# Patient Record
Sex: FEMALE | Race: WHITE | NOT HISPANIC OR LATINO | Employment: OTHER | URBAN - METROPOLITAN AREA
[De-identification: names, ages, dates, MRNs, and addresses within clinical notes are randomized per-mention and may not be internally consistent; named-entity substitution may affect disease eponyms.]

---

## 2020-06-23 ENCOUNTER — TELEPHONE (OUTPATIENT)
Dept: PAIN MEDICINE | Facility: CLINIC | Age: 60
End: 2020-06-23

## 2020-06-23 NOTE — TELEPHONE ENCOUNTER
Called patient at all available numbers in reference to a referral to  Ambulatory Colorectal Surgery for colon cancer screening, patient requested to be called back next week.

## 2020-06-23 NOTE — TELEPHONE ENCOUNTER
----- Message from Kaylah Tobias sent at 6/23/2020  4:22 PM CDT -----  Good afternoon,       NP Tiffany Figueroa would like to refer the following patient to [facility] for a colonoscopy. I have a scanned the following patient's referral and records into .     If there are any further questions in regards to the patient, please contact referring office at [phone].       Thank you,   Kaylah   Paris Small

## 2021-01-22 PROBLEM — K52.9 ACUTE GASTROENTERITIS: Status: ACTIVE | Noted: 2021-01-22

## 2021-01-22 PROBLEM — E87.1 HYPONATREMIA: Status: ACTIVE | Noted: 2021-01-22

## 2021-01-22 PROBLEM — E87.6 HYPOKALEMIA: Status: ACTIVE | Noted: 2021-01-22

## 2021-01-22 PROBLEM — Z72.0 TOBACCO ABUSE: Status: ACTIVE | Noted: 2021-01-22

## 2021-01-22 PROBLEM — N17.9 AKI (ACUTE KIDNEY INJURY): Status: ACTIVE | Noted: 2021-01-22

## 2021-01-22 PROBLEM — M25.559 HIP PAIN: Status: ACTIVE | Noted: 2021-01-22

## 2021-01-22 PROBLEM — G62.9 NEUROPATHY: Status: ACTIVE | Noted: 2021-01-22

## 2021-01-22 PROBLEM — J44.1 COPD EXACERBATION: Status: ACTIVE | Noted: 2021-01-22

## 2021-01-24 PROBLEM — K52.9 ACUTE GASTROENTERITIS: Status: RESOLVED | Noted: 2021-01-22 | Resolved: 2021-01-24

## 2021-01-24 PROBLEM — J44.1 COPD EXACERBATION: Status: RESOLVED | Noted: 2021-01-22 | Resolved: 2021-01-24

## 2021-01-24 PROBLEM — E87.1 HYPONATREMIA: Status: RESOLVED | Noted: 2021-01-22 | Resolved: 2021-01-24

## 2021-01-24 PROBLEM — E87.6 HYPOKALEMIA: Status: RESOLVED | Noted: 2021-01-22 | Resolved: 2021-01-24

## 2021-01-24 PROBLEM — F10.10 ALCOHOL ABUSE: Status: ACTIVE | Noted: 2021-01-24

## 2021-01-24 PROBLEM — N17.9 AKI (ACUTE KIDNEY INJURY): Status: RESOLVED | Noted: 2021-01-22 | Resolved: 2021-01-24

## 2021-01-26 ENCOUNTER — PATIENT OUTREACH (OUTPATIENT)
Dept: ADMINISTRATIVE | Facility: CLINIC | Age: 61
End: 2021-01-26

## 2023-04-20 ENCOUNTER — TELEPHONE (OUTPATIENT)
Dept: DERMATOLOGY | Facility: CLINIC | Age: 63
End: 2023-04-20
Payer: MEDICAID

## 2023-04-20 NOTE — TELEPHONE ENCOUNTER
NP scheduled for same-day Mohs 6/13 7:50am  BCC nasal dorsum, bx by Dr. Reich. Pt cnfirmed date, time, and location. Over-the-phone consult completed. Appt information sent in the mail.

## 2023-06-12 ENCOUNTER — TELEPHONE (OUTPATIENT)
Dept: DERMATOLOGY | Facility: CLINIC | Age: 63
End: 2023-06-12
Payer: MEDICAID

## 2023-06-12 DIAGNOSIS — C44.311 BASAL CELL CARCINOMA (BCC) OF DORSUM OF NOSE: Primary | ICD-10-CM

## 2023-06-12 RX ORDER — DIAZEPAM 5 MG/1
5 TABLET ORAL EVERY 6 HOURS PRN
Qty: 2 TABLET | Refills: 0 | Status: SHIPPED | OUTPATIENT
Start: 2023-06-12 | End: 2023-06-13

## 2023-06-12 NOTE — TELEPHONE ENCOUNTER
----- Message from Penny Goodwin PA-C sent at 6/12/2023  4:06 PM CDT -----  Regarding: RE: Valium prescription  Please call patient and inform her I sent the prescription to her [facility] pharmacy.    Thanks,  An  ----- Message -----  From: Annika Olea RN  Sent: 6/12/2023   1:41 PM CDT  To: Penny Goodwin PA-C  Subject: Valium prescription                              This is the patient who is requesting a valium prescription. Thanks!

## 2023-06-12 NOTE — TELEPHONE ENCOUNTER
Called patient to confirm appointment for BCC nasal dorsum on 6/13 at 7:50am. Patient requested a valium prescription. Sent patient's chart to An EVERETT Goodwin

## 2023-06-13 ENCOUNTER — PROCEDURE VISIT (OUTPATIENT)
Dept: DERMATOLOGY | Facility: CLINIC | Age: 63
End: 2023-06-13
Payer: MEDICAID

## 2023-06-13 ENCOUNTER — ANESTHESIA EVENT (OUTPATIENT)
Dept: SURGERY | Facility: HOSPITAL | Age: 63
End: 2023-06-13

## 2023-06-13 VITALS
BODY MASS INDEX: 23.04 KG/M2 | HEIGHT: 63 IN | DIASTOLIC BLOOD PRESSURE: 102 MMHG | WEIGHT: 130.06 LBS | HEART RATE: 91 BPM | SYSTOLIC BLOOD PRESSURE: 180 MMHG

## 2023-06-13 DIAGNOSIS — C44.311 BASAL CELL CARCINOMA OF LEFT SIDE OF NOSE: Primary | ICD-10-CM

## 2023-06-13 DIAGNOSIS — M95.0 MOHS DEFECT OF SIDEWALL OF NOSE: Primary | ICD-10-CM

## 2023-06-13 DIAGNOSIS — Z98.890 MOHS DEFECT OF SIDEWALL OF NOSE: Primary | ICD-10-CM

## 2023-06-13 PROCEDURE — 17312 MOHS ADDL STAGE: CPT | Mod: S$PBB,,, | Performed by: DERMATOLOGY

## 2023-06-13 PROCEDURE — 17311 MOHS 1 STAGE H/N/HF/G: CPT | Mod: PBBFAC | Performed by: DERMATOLOGY

## 2023-06-13 PROCEDURE — 17311: ICD-10-PCS | Mod: S$PBB,,, | Performed by: DERMATOLOGY

## 2023-06-13 PROCEDURE — 17312 MOHS ADDL STAGE: CPT | Mod: PBBFAC | Performed by: DERMATOLOGY

## 2023-06-13 PROCEDURE — 99499 NO LOS: ICD-10-PCS | Mod: S$PBB,,, | Performed by: DERMATOLOGY

## 2023-06-13 PROCEDURE — 99499 UNLISTED E&M SERVICE: CPT | Mod: S$PBB,,, | Performed by: DERMATOLOGY

## 2023-06-13 PROCEDURE — 17311 MOHS 1 STAGE H/N/HF/G: CPT | Mod: S$PBB,,, | Performed by: DERMATOLOGY

## 2023-06-13 PROCEDURE — 17312: ICD-10-PCS | Mod: S$PBB,,, | Performed by: DERMATOLOGY

## 2023-06-13 NOTE — PROGRESS NOTES
"New patient with a 17 years h/o growth on the nasal dorsum. Patient states she had it removed 3 times already and it keeps returning, oozes frequently. (+) "yellow fluid". Biopsy in 2019 c/w BCC. No prior treatment. According to Dr. Reich's note from 4/9/23, patient was to see Dr. Birch back in 2019 but he did not accept her insurance and then the COVID pandemic happened and she did not seek further treatment. (+) smokes 1.5 ppd x 50 years.  States she originally had lesion removed by Dr. Yasmany Ordonez in [place], 15 years ago, no repair done, wound healed in naturally.     Physical Exam   HENT:   Nose:         L superior nasal sidewall with a 10 x 12 mm ulcerated firn nodule located at superior edge of white scar and located 1 cm inferiorly from the left medial canthus and 4 cm superiorly from the left alar base.    Biopsy proven nodular basal cell carcinoma- Nasal dorsum, path# [id number].  Diagnosis, photograph, and pathology report were reviewed with patient.  Discussed risks, benefits, and alternatives of Mohs surgery.  Discussed repair options including complex closure, skin flap, skin graft, and second intention healing.  Patient agreed to proceed with Mohs surgery today.      PROCEDURE: Mohs' Micrographic Surgery    INDICATION: Location in mask areas of face including central face, nose, eyelids, eyebrows, lips, chin, preauricular, temple, and ear. Size > 1.0 cm on face. Biopsy-proven skin cancer of cosmetically and functionally important areas, including head, neck, genital, hand, foot, or areas known for having difficulty in healing, such as the lower anterior legs. Tumors with aggressive clinical behavior (rapidly growing, greater than 1 cm in diameter). Tumor with ill-defined borders. Recurrent or incompletely excised tumors.    REFERRING PROVIDER:  Eriberto Reich MD    CASE NUMBER:     ANESTHETIC: 5 cc 0.5% Bupivacaine with Epi 1:200,000 mixed 1:1 with plain 1% Lidocaine    SURGICAL PREP: " Betadine    SURGEON: [place] Ibarra MD    ASSISTANTS: Penny Goodwin PA-C and Saleem Olivo, Surg Tech    PREOPERATIVE DIAGNOSIS: basal cell carcinoma- nodular    POSTOPERATIVE DIAGNOSIS: basal cell carcinoma- nodular, infiltrating    PATHOLOGIC DIAGNOSIS: basal cell carcinoma- nodular, infiltrating    HISTOLOGY OF SPECIMENS IN FIRST STAGE:   Tumor Type: Tumor seen. Nodular basal cell carcinoma: Nodular tumor in dermis composed of basaloid cells exhibiting peripheral palisading and retraction artifact.  Infiltrative basal cell carcinoma: Basaloid tumor in dermis characterized by thin elongated strands of basaloid cells infiltrating between dermal collagen bundles.   Depth of Invasion: epidermis, dermis, and subcutaneous tissue  Perineural Invasion: Yes, of nerve >0.1mm in diameter  Intravascular Invasion: Yes    HISTOLOGY OF SPECIMENS IN SUBSEQUENT STAGES:  Tumor Type: Tumor seen. Same as in first stage.   Depth of Invasion: epidermis, dermis, subcutaneous tissue, and muscle  Perineural Invasion: No    STAGES OF MOHS' SURGERY PERFORMED: 3    TUMOR-FREE PLANE ACHIEVED: No. Mohs stopped. Peripheral margins clear. Still with residual tumor at deep margin of nasal bone and soft tissue. Unable to clear perinerual and intravascular disease as this was on top of nasal bone. Will refer to Dr. Louis Bullock in ENT Plastics for deep margin resection and subsequent reconstruction. Disc in detail with patient and her daughter.  Disc postop radiation therapy but will need to wait for her to be healed postoperatively first.      HEMOSTASIS: electrocoagulation and 4-0 vicryl suture ties     SPECIMENS: 11 (4 in stage A, 4 in stage B, and 3 in stage C)    LOCATION: nasal dorsum (left superior nasal sidewall). Location verified with Dr. Reich's clinical photograph. Patient also verified location with hand held mirror.    INITIAL LESION SIZE: 1.0 x 1.2 cm    FINAL DEFECT SIZE: 1.9 x 2.0 cm    WOUND REPAIR/DISPOSITION: The patient tolerated  "Mohs' Micrographic Surgery for a basal cell carcinoma very well. When Mohs was stopped, the patient was referred to  Dr. Louis Bullock in ENT Plastics  for deep margin resection and repair of the surgical defect.  Will recheck patient in 6 weeks and discuss postop XRT at that time.     Vitals:    06/13/23 0742 06/13/23 0753 06/13/23 1209   BP: (!) 177/106 (!) 182/110 (!) 180/102   BP Location: Left arm Left arm Left arm   Patient Position: Sitting Sitting Sitting   BP Method: Medium (Automatic) Medium (Manual) Medium (Manual)   Pulse: 96 96 91   Weight: 59 kg (130 lb 1.1 oz)     Height: 5' 3" (1.6 m)           "
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
## 2023-06-13 NOTE — Clinical Note
Adamy - In letter to Dr. Reich, please mention that she had perineural and intravascular invasion of the basal cell carcinoma overlying the nasal sidewall bone and as such she was referred to Dr Bullock for deep margin resection. We also discussed postoperative adjuvant radiation therapy once she is healed and she may consider getting this closer to home.

## 2023-06-13 NOTE — H&P (VIEW-ONLY)
"New patient with a 17 years h/o growth on the nasal dorsum. Patient states she had it removed 3 times already and it keeps returning, oozes frequently. (+) "yellow fluid". Biopsy in 2019 c/w BCC. No prior treatment. According to Dr. Reich's note from 4/9/23, patient was to see Dr. Birch back in 2019 but he did not accept her insurance and then the COVID pandemic happened and she did not seek further treatment. (+) smokes 1.5 ppd x 50 years.  States she originally had lesion removed by Dr. Yasmany Ordonez in [place], 15 years ago, no repair done, wound healed in naturally.     Physical Exam   HENT:   Nose:         L superior nasal sidewall with a 10 x 12 mm ulcerated firn nodule located at superior edge of white scar and located 1 cm inferiorly from the left medial canthus and 4 cm superiorly from the left alar base.    Biopsy proven nodular basal cell carcinoma- Nasal dorsum, path# [id number].  Diagnosis, photograph, and pathology report were reviewed with patient.  Discussed risks, benefits, and alternatives of Mohs surgery.  Discussed repair options including complex closure, skin flap, skin graft, and second intention healing.  Patient agreed to proceed with Mohs surgery today.      PROCEDURE: Mohs' Micrographic Surgery    INDICATION: Location in mask areas of face including central face, nose, eyelids, eyebrows, lips, chin, preauricular, temple, and ear. Size > 1.0 cm on face. Biopsy-proven skin cancer of cosmetically and functionally important areas, including head, neck, genital, hand, foot, or areas known for having difficulty in healing, such as the lower anterior legs. Tumors with aggressive clinical behavior (rapidly growing, greater than 1 cm in diameter). Tumor with ill-defined borders. Recurrent or incompletely excised tumors.    REFERRING PROVIDER:  Eriberto Reich MD    CASE NUMBER:     ANESTHETIC: 5 cc 0.5% Bupivacaine with Epi 1:200,000 mixed 1:1 with plain 1% Lidocaine    SURGICAL PREP: " Betadine    SURGEON: [place] Ibarra MD    ASSISTANTS: Penny Goodwin PA-C and Saleem Olivo, Surg Tech    PREOPERATIVE DIAGNOSIS: basal cell carcinoma- nodular    POSTOPERATIVE DIAGNOSIS: basal cell carcinoma- nodular, infiltrating    PATHOLOGIC DIAGNOSIS: basal cell carcinoma- nodular, infiltrating    HISTOLOGY OF SPECIMENS IN FIRST STAGE:   Tumor Type: Tumor seen. Nodular basal cell carcinoma: Nodular tumor in dermis composed of basaloid cells exhibiting peripheral palisading and retraction artifact.  Infiltrative basal cell carcinoma: Basaloid tumor in dermis characterized by thin elongated strands of basaloid cells infiltrating between dermal collagen bundles.   Depth of Invasion: epidermis, dermis, and subcutaneous tissue  Perineural Invasion: Yes, of nerve >0.1mm in diameter  Intravascular Invasion: Yes    HISTOLOGY OF SPECIMENS IN SUBSEQUENT STAGES:  Tumor Type: Tumor seen. Same as in first stage.   Depth of Invasion: epidermis, dermis, subcutaneous tissue, and muscle  Perineural Invasion: No    STAGES OF MOHS' SURGERY PERFORMED: 3    TUMOR-FREE PLANE ACHIEVED: No. Mohs stopped. Peripheral margins clear. Still with residual tumor at deep margin of nasal bone and soft tissue. Unable to clear perinerual and intravascular disease as this was on top of nasal bone. Will refer to Dr. Louis Bullock in ENT Plastics for deep margin resection and subsequent reconstruction. Disc in detail with patient and her daughter.  Disc postop radiation therapy but will need to wait for her to be healed postoperatively first.      HEMOSTASIS: electrocoagulation and 4-0 vicryl suture ties     SPECIMENS: 11 (4 in stage A, 4 in stage B, and 3 in stage C)    LOCATION: nasal dorsum (left superior nasal sidewall). Location verified with Dr. Reich's clinical photograph. Patient also verified location with hand held mirror.    INITIAL LESION SIZE: 1.0 x 1.2 cm    FINAL DEFECT SIZE: 1.9 x 2.0 cm    WOUND REPAIR/DISPOSITION: The patient tolerated  "Mohs' Micrographic Surgery for a basal cell carcinoma very well. When Mohs was stopped, the patient was referred to  Dr. Louis Bullock in ENT Plastics  for deep margin resection and repair of the surgical defect.  Will recheck patient in 6 weeks and discuss postop XRT at that time.     Vitals:    06/13/23 0742 06/13/23 0753 06/13/23 1209   BP: (!) 177/106 (!) 182/110 (!) 180/102   BP Location: Left arm Left arm Left arm   Patient Position: Sitting Sitting Sitting   BP Method: Medium (Automatic) Medium (Manual) Medium (Manual)   Pulse: 96 96 91   Weight: 59 kg (130 lb 1.1 oz)     Height: 5' 3" (1.6 m)           "

## 2023-06-13 NOTE — PRE-PROCEDURE INSTRUCTIONS
Following instructions given via phone:    On the day of surgery please report to [facility] located at [address] [place] [address].  Please park in the lot in front of the building and enter at the main entrance. Proceed to the second floor for registration.    Arrival time: 0500    You may not drive home after your procedure. You may not leave alone in an uber, taxi, etc.  You must be discharged to a responsible adult.  If possible, please have your visitor &/or ride home stay during your visit.   The surgeon should speak with your visitors after your procedure.  All visitors must be 18 years of age or older. Please limit your visitors to max of 2 people.  Have visitors bring snacks &/or lunch as the facility only has drink vending machines.    No food, no drink after midnight.  Take the following medications the morning of your procedure: see med list, take with water    If applicable, do not shave the surgical site 5 days prior to your procedure.  Shower the night before and the morning of your procedure with antibacterial soap.  Do not apply any products to your skin nor your hair after you shower the morning of your procedure.  Wear loose, comfortable clothing.  No jewelry. No contacts. Bring glasses if necessary.  If you have dentures, bring a case.  Leave all valuables at home.

## 2023-06-14 ENCOUNTER — HOSPITAL ENCOUNTER (OUTPATIENT)
Facility: HOSPITAL | Age: 63
Discharge: HOME | End: 2023-06-14
Attending: OTOLARYNGOLOGY | Admitting: OTOLARYNGOLOGY
Payer: MEDICAID

## 2023-06-14 ENCOUNTER — ANESTHESIA (OUTPATIENT)
Dept: SURGERY | Facility: HOSPITAL | Age: 63
End: 2023-06-14

## 2023-06-14 VITALS
DIASTOLIC BLOOD PRESSURE: 65 MMHG | TEMPERATURE: 98 F | BODY MASS INDEX: 26.57 KG/M2 | HEART RATE: 100 BPM | OXYGEN SATURATION: 94 % | WEIGHT: 150 LBS | RESPIRATION RATE: 17 BRPM | SYSTOLIC BLOOD PRESSURE: 128 MMHG

## 2023-06-14 DIAGNOSIS — L98.8 MOHS DEFECT: ICD-10-CM

## 2023-06-14 DIAGNOSIS — Z98.890 MOHS DEFECT: ICD-10-CM

## 2023-06-14 DIAGNOSIS — E87.5 SERUM POTASSIUM ELEVATED: ICD-10-CM

## 2023-06-14 LAB
ALBUMIN SERPL BCP-MCNC: 3.6 G/DL (ref 3.5–5.2)
ALP SERPL-CCNC: 114 U/L (ref 55–135)
ALT SERPL W/O P-5'-P-CCNC: 20 U/L (ref 10–44)
ANION GAP SERPL CALC-SCNC: 10 MMOL/L (ref 8–16)
AST SERPL-CCNC: 40 U/L (ref 10–40)
BASOPHILS # BLD AUTO: 0.04 K/UL (ref 0–0.2)
BASOPHILS NFR BLD: 0.5 % (ref 0–1.9)
BILIRUB SERPL-MCNC: 1.5 MG/DL (ref 0.1–1)
BUN SERPL-MCNC: 5 MG/DL (ref 8–23)
CALCIUM SERPL-MCNC: 9.5 MG/DL (ref 8.7–10.5)
CHLORIDE SERPL-SCNC: 103 MMOL/L (ref 95–110)
CO2 SERPL-SCNC: 25 MMOL/L (ref 23–29)
CREAT SERPL-MCNC: 0.6 MG/DL (ref 0.5–1.4)
DIFFERENTIAL METHOD: ABNORMAL
EOSINOPHIL # BLD AUTO: 0.1 K/UL (ref 0–0.5)
EOSINOPHIL NFR BLD: 0.9 % (ref 0–8)
ERYTHROCYTE [DISTWIDTH] IN BLOOD BY AUTOMATED COUNT: 12.7 % (ref 11.5–14.5)
EST. GFR  (NO RACE VARIABLE): >60 ML/MIN/1.73 M^2
GLUCOSE SERPL-MCNC: 93 MG/DL (ref 70–110)
HCT VFR BLD AUTO: 46.4 % (ref 37–48.5)
HGB BLD-MCNC: 15.2 G/DL (ref 12–16)
IMM GRANULOCYTES # BLD AUTO: 0.02 K/UL (ref 0–0.04)
IMM GRANULOCYTES NFR BLD AUTO: 0.2 % (ref 0–0.5)
LYMPHOCYTES # BLD AUTO: 2.8 K/UL (ref 1–4.8)
LYMPHOCYTES NFR BLD: 33.2 % (ref 18–48)
MCH RBC QN AUTO: 33.2 PG (ref 27–31)
MCHC RBC AUTO-ENTMCNC: 32.8 G/DL (ref 32–36)
MCV RBC AUTO: 101 FL (ref 82–98)
MONOCYTES # BLD AUTO: 0.6 K/UL (ref 0.3–1)
MONOCYTES NFR BLD: 6.5 % (ref 4–15)
NEUTROPHILS # BLD AUTO: 5 K/UL (ref 1.8–7.7)
NEUTROPHILS NFR BLD: 58.7 % (ref 38–73)
NRBC BLD-RTO: 0 /100 WBC
PLATELET # BLD AUTO: 212 K/UL (ref 150–450)
PMV BLD AUTO: 10.9 FL (ref 9.2–12.9)
POTASSIUM SERPL-SCNC: 4.9 MMOL/L (ref 3.5–5.1)
PROT SERPL-MCNC: 6.7 G/DL (ref 6–8.4)
RBC # BLD AUTO: 4.58 M/UL (ref 4–5.4)
SODIUM SERPL-SCNC: 138 MMOL/L (ref 136–145)
WBC # BLD AUTO: 8.52 K/UL (ref 3.9–12.7)

## 2023-06-14 PROCEDURE — 36415 COLL VENOUS BLD VENIPUNCTURE: CPT | Performed by: OTOLARYNGOLOGY

## 2023-06-14 PROCEDURE — 93010 EKG 12-LEAD: ICD-10-PCS | Mod: ,,, | Performed by: INTERNAL MEDICINE

## 2023-06-14 PROCEDURE — 85014 HEMATOCRIT: CPT | Mod: 91

## 2023-06-14 PROCEDURE — 85025 COMPLETE CBC W/AUTO DIFF WBC: CPT | Performed by: OTOLARYNGOLOGY

## 2023-06-14 PROCEDURE — 93010 ELECTROCARDIOGRAM REPORT: CPT | Mod: ,,, | Performed by: INTERNAL MEDICINE

## 2023-06-14 PROCEDURE — 94760 N-INVAS EAR/PLS OXIMETRY 1: CPT

## 2023-06-14 PROCEDURE — 99900035 HC TECH TIME PER 15 MIN (STAT)

## 2023-06-14 PROCEDURE — 93005 ELECTROCARDIOGRAM TRACING: CPT

## 2023-06-14 PROCEDURE — 80053 COMPREHEN METABOLIC PANEL: CPT | Performed by: OTOLARYNGOLOGY

## 2023-06-14 PROCEDURE — 84132 ASSAY OF SERUM POTASSIUM: CPT | Mod: 91

## 2023-06-14 PROCEDURE — 82803 BLOOD GASES ANY COMBINATION: CPT

## 2023-06-14 PROCEDURE — 84295 ASSAY OF SERUM SODIUM: CPT

## 2023-06-14 PROCEDURE — 82800 BLOOD PH: CPT | Mod: 59

## 2023-06-14 RX ORDER — CLONIDINE HYDROCHLORIDE 0.1 MG/1
0.2 TABLET ORAL ONCE AS NEEDED
Status: DISCONTINUED | OUTPATIENT
Start: 2023-06-14 | End: 2023-06-15 | Stop reason: HOSPADM

## 2023-06-14 RX ORDER — ALBUTEROL SULFATE 2.5 MG/.5ML
2.5 SOLUTION RESPIRATORY (INHALATION) EVERY 4 HOURS PRN
Status: DISCONTINUED | OUTPATIENT
Start: 2023-06-14 | End: 2023-06-15 | Stop reason: HOSPADM

## 2023-06-15 ENCOUNTER — HOSPITAL ENCOUNTER (OUTPATIENT)
Facility: HOSPITAL | Age: 63
Discharge: HOME | End: 2023-06-15
Attending: OTOLARYNGOLOGY | Admitting: OTOLARYNGOLOGY
Payer: MEDICAID

## 2023-06-15 ENCOUNTER — ANESTHESIA (OUTPATIENT)
Dept: SURGERY | Facility: HOSPITAL | Age: 63
End: 2023-06-15
Payer: MEDICAID

## 2023-06-15 ENCOUNTER — ANESTHESIA EVENT (OUTPATIENT)
Dept: SURGERY | Facility: HOSPITAL | Age: 63
End: 2023-06-15
Payer: MEDICAID

## 2023-06-15 VITALS
WEIGHT: 150 LBS | TEMPERATURE: 98 F | DIASTOLIC BLOOD PRESSURE: 75 MMHG | OXYGEN SATURATION: 92 % | RESPIRATION RATE: 29 BRPM | HEART RATE: 82 BPM | BODY MASS INDEX: 26.58 KG/M2 | HEIGHT: 63 IN | SYSTOLIC BLOOD PRESSURE: 127 MMHG

## 2023-06-15 DIAGNOSIS — Z98.890 MOHS DEFECT: ICD-10-CM

## 2023-06-15 DIAGNOSIS — L98.8 MOHS DEFECT: ICD-10-CM

## 2023-06-15 DIAGNOSIS — M95.0 MOHS DEFECT OF SIDEWALL OF NOSE: Primary | ICD-10-CM

## 2023-06-15 DIAGNOSIS — Z98.890 MOHS DEFECT OF SIDEWALL OF NOSE: Primary | ICD-10-CM

## 2023-06-15 PROCEDURE — 71000044 HC DOSC ROUTINE RECOVERY FIRST HOUR: Performed by: OTOLARYNGOLOGY

## 2023-06-15 PROCEDURE — 88302 PR  SURG PATH,LEVEL II: ICD-10-PCS | Mod: 26,,, | Performed by: STUDENT IN AN ORGANIZED HEALTH CARE EDUCATION/TRAINING PROGRAM

## 2023-06-15 PROCEDURE — 13132 CMPLX RPR F/C/C/M/N/AX/G/H/F: CPT | Mod: 59,,, | Performed by: OTOLARYNGOLOGY

## 2023-06-15 PROCEDURE — 63600175 PHARM REV CODE 636 W HCPCS: Performed by: NURSE ANESTHETIST, CERTIFIED REGISTERED

## 2023-06-15 PROCEDURE — 13132 PR RECMPL WND HEAD,FAC,HAND 2.6-7.5 CM: ICD-10-PCS | Mod: 59,,, | Performed by: OTOLARYNGOLOGY

## 2023-06-15 PROCEDURE — D9220A PRA ANESTHESIA: ICD-10-PCS | Mod: CRNA,,, | Performed by: NURSE ANESTHETIST, CERTIFIED REGISTERED

## 2023-06-15 PROCEDURE — 14061 PR ADJ TISS XFER LID,NOS,EAR 10.1-30 SQCM: ICD-10-PCS | Mod: ,,, | Performed by: OTOLARYNGOLOGY

## 2023-06-15 PROCEDURE — 63600175 PHARM REV CODE 636 W HCPCS: Performed by: ANESTHESIOLOGY

## 2023-06-15 PROCEDURE — 25000003 PHARM REV CODE 250: Performed by: NURSE ANESTHETIST, CERTIFIED REGISTERED

## 2023-06-15 PROCEDURE — 25000003 PHARM REV CODE 250: Performed by: OTOLARYNGOLOGY

## 2023-06-15 PROCEDURE — 88305 TISSUE EXAM BY PATHOLOGIST: CPT | Performed by: STUDENT IN AN ORGANIZED HEALTH CARE EDUCATION/TRAINING PROGRAM

## 2023-06-15 PROCEDURE — 63600175 PHARM REV CODE 636 W HCPCS: Performed by: STUDENT IN AN ORGANIZED HEALTH CARE EDUCATION/TRAINING PROGRAM

## 2023-06-15 PROCEDURE — 88305 TISSUE EXAM BY PATHOLOGIST: ICD-10-PCS | Mod: 26,,, | Performed by: STUDENT IN AN ORGANIZED HEALTH CARE EDUCATION/TRAINING PROGRAM

## 2023-06-15 PROCEDURE — 37000008 HC ANESTHESIA 1ST 15 MINUTES: Performed by: OTOLARYNGOLOGY

## 2023-06-15 PROCEDURE — 00300 ANES ALL PX INTEG H/N/PTRUNK: CPT | Performed by: OTOLARYNGOLOGY

## 2023-06-15 PROCEDURE — 88305 TISSUE EXAM BY PATHOLOGIST: CPT | Mod: 26,,, | Performed by: STUDENT IN AN ORGANIZED HEALTH CARE EDUCATION/TRAINING PROGRAM

## 2023-06-15 PROCEDURE — 14061 TIS TRNFR E/N/E/L10.1-30SQCM: CPT | Mod: ,,, | Performed by: OTOLARYNGOLOGY

## 2023-06-15 PROCEDURE — 88302 TISSUE EXAM BY PATHOLOGIST: CPT | Performed by: STUDENT IN AN ORGANIZED HEALTH CARE EDUCATION/TRAINING PROGRAM

## 2023-06-15 PROCEDURE — 36000706: Performed by: OTOLARYNGOLOGY

## 2023-06-15 PROCEDURE — 25000003 PHARM REV CODE 250: Performed by: STUDENT IN AN ORGANIZED HEALTH CARE EDUCATION/TRAINING PROGRAM

## 2023-06-15 PROCEDURE — 36000707: Performed by: OTOLARYNGOLOGY

## 2023-06-15 PROCEDURE — D9220A PRA ANESTHESIA: Mod: CRNA,,, | Performed by: NURSE ANESTHETIST, CERTIFIED REGISTERED

## 2023-06-15 PROCEDURE — 88302 TISSUE EXAM BY PATHOLOGIST: CPT | Mod: 26,,, | Performed by: STUDENT IN AN ORGANIZED HEALTH CARE EDUCATION/TRAINING PROGRAM

## 2023-06-15 PROCEDURE — 71000015 HC POSTOP RECOV 1ST HR: Performed by: OTOLARYNGOLOGY

## 2023-06-15 PROCEDURE — D9220A PRA ANESTHESIA: Mod: ANES,,, | Performed by: ANESTHESIOLOGY

## 2023-06-15 PROCEDURE — 37000009 HC ANESTHESIA EA ADD 15 MINS: Performed by: OTOLARYNGOLOGY

## 2023-06-15 PROCEDURE — D9220A PRA ANESTHESIA: ICD-10-PCS | Mod: ANES,,, | Performed by: ANESTHESIOLOGY

## 2023-06-15 RX ORDER — HYDROMORPHONE HYDROCHLORIDE 1 MG/ML
0.2 INJECTION, SOLUTION INTRAMUSCULAR; INTRAVENOUS; SUBCUTANEOUS EVERY 5 MIN PRN
Status: DISCONTINUED | OUTPATIENT
Start: 2023-06-15 | End: 2023-06-15 | Stop reason: HOSPADM

## 2023-06-15 RX ORDER — ONDANSETRON 4 MG/1
4 TABLET, ORALLY DISINTEGRATING ORAL EVERY 6 HOURS PRN
Qty: 15 TABLET | Refills: 0 | Status: SHIPPED | OUTPATIENT
Start: 2023-06-15

## 2023-06-15 RX ORDER — DIPHENHYDRAMINE HYDROCHLORIDE 50 MG/ML
25 INJECTION INTRAMUSCULAR; INTRAVENOUS EVERY 6 HOURS PRN
Status: DISCONTINUED | OUTPATIENT
Start: 2023-06-15 | End: 2023-06-15 | Stop reason: HOSPADM

## 2023-06-15 RX ORDER — LIDOCAINE HYDROCHLORIDE AND EPINEPHRINE 10; 10 MG/ML; UG/ML
INJECTION, SOLUTION INFILTRATION; PERINEURAL
Status: DISCONTINUED | OUTPATIENT
Start: 2023-06-15 | End: 2023-06-15 | Stop reason: HOSPADM

## 2023-06-15 RX ORDER — DEXAMETHASONE SODIUM PHOSPHATE 4 MG/ML
INJECTION, SOLUTION INTRA-ARTICULAR; INTRALESIONAL; INTRAMUSCULAR; INTRAVENOUS; SOFT TISSUE
Status: DISCONTINUED | OUTPATIENT
Start: 2023-06-15 | End: 2023-06-15

## 2023-06-15 RX ORDER — SUCCINYLCHOLINE CHLORIDE 20 MG/ML
INJECTION INTRAMUSCULAR; INTRAVENOUS
Status: DISCONTINUED | OUTPATIENT
Start: 2023-06-15 | End: 2023-06-15

## 2023-06-15 RX ORDER — FENTANYL CITRATE 50 UG/ML
25 INJECTION, SOLUTION INTRAMUSCULAR; INTRAVENOUS EVERY 5 MIN PRN
Status: DISCONTINUED | OUTPATIENT
Start: 2023-06-15 | End: 2023-06-15 | Stop reason: HOSPADM

## 2023-06-15 RX ORDER — MIDAZOLAM HYDROCHLORIDE 1 MG/ML
INJECTION, SOLUTION INTRAMUSCULAR; INTRAVENOUS
Status: DISCONTINUED | OUTPATIENT
Start: 2023-06-15 | End: 2023-06-15

## 2023-06-15 RX ORDER — HYDROCODONE BITARTRATE AND ACETAMINOPHEN 5; 325 MG/1; MG/1
TABLET ORAL
Status: DISCONTINUED
Start: 2023-06-15 | End: 2023-06-15 | Stop reason: HOSPADM

## 2023-06-15 RX ORDER — HYDROCODONE BITARTRATE AND ACETAMINOPHEN 5; 325 MG/1; MG/1
1 TABLET ORAL EVERY 6 HOURS PRN
Qty: 10 TABLET | Refills: 0 | Status: SHIPPED | OUTPATIENT
Start: 2023-06-15 | End: 2023-06-19

## 2023-06-15 RX ORDER — ONDANSETRON 2 MG/ML
4 INJECTION INTRAMUSCULAR; INTRAVENOUS ONCE AS NEEDED
Status: DISCONTINUED | OUTPATIENT
Start: 2023-06-15 | End: 2023-06-15 | Stop reason: HOSPADM

## 2023-06-15 RX ORDER — HYDROCODONE BITARTRATE AND ACETAMINOPHEN 5; 325 MG/1; MG/1
1 TABLET ORAL ONCE AS NEEDED
Status: COMPLETED | OUTPATIENT
Start: 2023-06-15 | End: 2023-06-15

## 2023-06-15 RX ORDER — ROCURONIUM BROMIDE 10 MG/ML
INJECTION, SOLUTION INTRAVENOUS
Status: DISCONTINUED | OUTPATIENT
Start: 2023-06-15 | End: 2023-06-15

## 2023-06-15 RX ORDER — LIDOCAINE HYDROCHLORIDE 20 MG/ML
INJECTION, SOLUTION EPIDURAL; INFILTRATION; INTRACAUDAL; PERINEURAL
Status: DISCONTINUED | OUTPATIENT
Start: 2023-06-15 | End: 2023-06-15

## 2023-06-15 RX ORDER — HYDROMORPHONE HYDROCHLORIDE 2 MG/ML
INJECTION, SOLUTION INTRAMUSCULAR; INTRAVENOUS; SUBCUTANEOUS
Status: DISCONTINUED | OUTPATIENT
Start: 2023-06-15 | End: 2023-06-15

## 2023-06-15 RX ORDER — SODIUM CHLORIDE 0.9 % (FLUSH) 0.9 %
10 SYRINGE (ML) INJECTION
Status: CANCELLED | OUTPATIENT
Start: 2023-06-15

## 2023-06-15 RX ORDER — PHENYLEPHRINE HCL IN 0.9% NACL 1 MG/10 ML
SYRINGE (ML) INTRAVENOUS
Status: DISCONTINUED | OUTPATIENT
Start: 2023-06-15 | End: 2023-06-15

## 2023-06-15 RX ORDER — PROPOFOL 10 MG/ML
VIAL (ML) INTRAVENOUS
Status: DISCONTINUED | OUTPATIENT
Start: 2023-06-15 | End: 2023-06-15

## 2023-06-15 RX ORDER — EPHEDRINE SULFATE 50 MG/ML
INJECTION, SOLUTION INTRAVENOUS
Status: DISCONTINUED | OUTPATIENT
Start: 2023-06-15 | End: 2023-06-15

## 2023-06-15 RX ORDER — FENTANYL CITRATE 50 UG/ML
INJECTION, SOLUTION INTRAMUSCULAR; INTRAVENOUS
Status: DISCONTINUED | OUTPATIENT
Start: 2023-06-15 | End: 2023-06-15

## 2023-06-15 RX ORDER — ONDANSETRON 2 MG/ML
INJECTION INTRAMUSCULAR; INTRAVENOUS
Status: DISCONTINUED | OUTPATIENT
Start: 2023-06-15 | End: 2023-06-15

## 2023-06-15 RX ORDER — LABETALOL HYDROCHLORIDE 5 MG/ML
INJECTION, SOLUTION INTRAVENOUS
Status: DISCONTINUED | OUTPATIENT
Start: 2023-06-15 | End: 2023-06-15

## 2023-06-15 RX ADMIN — Medication 100 MCG: at 01:06

## 2023-06-15 RX ADMIN — Medication 200 MCG: at 02:06

## 2023-06-15 RX ADMIN — FENTANYL CITRATE 100 MCG: 50 INJECTION, SOLUTION INTRAMUSCULAR; INTRAVENOUS at 01:06

## 2023-06-15 RX ADMIN — HYDROCODONE BITARTRATE AND ACETAMINOPHEN 1 TABLET: 5; 325 TABLET ORAL at 03:06

## 2023-06-15 RX ADMIN — SODIUM CHLORIDE: 0.9 INJECTION, SOLUTION INTRAVENOUS at 01:06

## 2023-06-15 RX ADMIN — HYDROMORPHONE HYDROCHLORIDE 0.5 MG: 2 INJECTION, SOLUTION INTRAMUSCULAR; INTRAVENOUS; SUBCUTANEOUS at 03:06

## 2023-06-15 RX ADMIN — EPHEDRINE SULFATE 15 MG: 50 INJECTION INTRAVENOUS at 01:06

## 2023-06-15 RX ADMIN — ONDANSETRON 4 MG: 2 INJECTION INTRAMUSCULAR; INTRAVENOUS at 01:06

## 2023-06-15 RX ADMIN — DEXAMETHASONE SODIUM PHOSPHATE 4 MG: 4 INJECTION, SOLUTION INTRAMUSCULAR; INTRAVENOUS at 01:06

## 2023-06-15 RX ADMIN — PROPOFOL 200 MG: 10 INJECTION, EMULSION INTRAVENOUS at 01:06

## 2023-06-15 RX ADMIN — CEFAZOLIN 2 G: 2 INJECTION, POWDER, FOR SOLUTION INTRAMUSCULAR; INTRAVENOUS at 01:06

## 2023-06-15 RX ADMIN — Medication 10 MG: at 03:06

## 2023-06-15 RX ADMIN — HYDROMORPHONE HYDROCHLORIDE 0.2 MG: 1 INJECTION, SOLUTION INTRAMUSCULAR; INTRAVENOUS; SUBCUTANEOUS at 03:06

## 2023-06-15 RX ADMIN — GLYCOPYRROLATE 0.2 MG: 0.2 INJECTION INTRAMUSCULAR; INTRAVENOUS at 01:06

## 2023-06-15 RX ADMIN — SUGAMMADEX 200 MG: 100 INJECTION, SOLUTION INTRAVENOUS at 03:06

## 2023-06-15 RX ADMIN — LIDOCAINE HYDROCHLORIDE 100 MG: 20 INJECTION, SOLUTION EPIDURAL; INFILTRATION; INTRACAUDAL; PERINEURAL at 01:06

## 2023-06-15 RX ADMIN — MIDAZOLAM HYDROCHLORIDE 2 MG: 1 INJECTION, SOLUTION INTRAMUSCULAR; INTRAVENOUS at 01:06

## 2023-06-15 RX ADMIN — EPHEDRINE SULFATE 15 MG: 50 INJECTION INTRAVENOUS at 02:06

## 2023-06-15 RX ADMIN — ROCURONIUM BROMIDE 10 MG: 10 INJECTION INTRAVENOUS at 01:06

## 2023-06-15 RX ADMIN — EPHEDRINE SULFATE 10 MG: 50 INJECTION INTRAVENOUS at 02:06

## 2023-06-15 RX ADMIN — ROCURONIUM BROMIDE 60 MG: 10 INJECTION INTRAVENOUS at 01:06

## 2023-06-15 RX ADMIN — SUCCINYLCHOLINE CHLORIDE 140 MG: 20 INJECTION, SOLUTION INTRAMUSCULAR; INTRAVENOUS at 01:06

## 2023-06-15 NOTE — OP NOTE
Date of service: 6/15/2023    Pre-operative Diagnosis:   1. Basal cell carcinoma of the nose   2. Mohs defect of the radix of the nose and left sidewall    Post-operative Diagnosis:  Same    Procedures Performed:   1.  Dorsal nasal advancement rotation flap for reconstruction of left nasal sidewall and canthal defect with advanced and rotated area 6 x 3 cm  2. Complex closure of forehead secondary defect with closed length 4.5 cm.      Surgeon: Louis Bullock MD    Assistant(s):  Chichi Garcia MD    Anesthesia: General Endotracheal    EBL:  25 cc    Specimens:  Re-excision deep margin with inked true margin sent for permanent.    Findings:  Patient had an approximately 2.5 x 2.5 cm defect of the nasal sidewall on the left as well as the medial canthal area.  The canthal tendon was intact.  Re-excision was done down through the periosteum of the nasal bones and was sent for permanent with inked true margin.  Portion of prior nasal scar was we excised inferiorly.  Dorsal nasal flap was used for reconstruction.    Indications for Procedure:  Ms. Levin is a 63-year-old female that Dr. Ibarra had taken 2 days ago for Mohs surgery with positive deep margin and I was consulted for re-excision of deep margin and closure.  She was initially set up for surgery yesterday but this was canceled at [facility] location due to potassium and concern by anesthesia.  She was rescheduled for closure today.    Procedure in Detail:  After informed consent was obtained in holding area the risks and benefits reviewed patient was taken back to OR 18.  Anesthesia proceeded with general endotracheal anesthesia and patient was turned 90° with time-out undertaken with verification of patient and correct procedure.  Photo documentation of the defect was undertaken and midface was prepped and draped in usual sterile fashion.    Area surrounding the defect was infiltrated with 1% lidocaine with [id number] of epinephrine.  Fifteen blade was used  1st to excise inferior portion of prior scar and this was sent off as a separate specimen for pathology.  Using needle-tip Bovie cautery starting from the edge of the incision deep aspect of the wound was taken all the way down to the periosteum and this was elevated with 9. Elevator from the nasal bones down to the level of the canthal tendon.  Care was taken not to disrupt the canthal tendon but all soft tissue above the tendon was removed with a deep margin reexcision.  This was sent off to pathology and marked with ink for the true margin.  Hemostasis was obtained with needle-tip cautery or bipolar cautery.  At this point elevation was undertaken into the lower eyelid cheek and along the nasal bones in a sub smashed fashion prior to design and elevation of the dorsal nasal flap.      Widest dimension of the defect was 2.5 cm  for this reason dorsal nasal flap was designed extending proximally 4.5 cm in length with triangle of tissue between the eyebrows taken up to mid forehead.  This was infiltrated with 1% lidocaine with [id number] of epinephrine.  Fifteen blade was used to make incision from the edge of the defect at approximately 1:00 a.m. extending into previously designed flap and then going along the right side to the edge of the eyebrow.  Elevation was undertaken in a sub galea plane with division of corrugators musculature for extension of the flap.  The total area undermined and elevated for rotation was 6 x 3 cm.  The flap was then rotated after freeing of all attachments and with adequate rotation and extension into the defect and this was sutured with deep 4-0 PDS suture to the native nasal skin and then along the lower eyelid and along the nasal dorsum.  Small portion of the flap was trimmed at the medial canthus with defatting in order to obtain better thickness match.  This was also secured with deep 5 0 PDS and then 5 0 fast-absorbing gut for the skin.  Secondary defect at the eyebrow was  undermined widely and this was closed with deep 4-0 PDS and 5 0 Prolene running locking for the skin.  Total length of secondary defect closed at the forehead was 4.5 cm. Remainder of the inset along the nasal dorsum and eyebrow was also closed with 5 0 Prolene running locking.  Wounds were dressed eyes were irrigated with balanced saline solution patient was turned over to Anesthesia awakened and taken to recovery in stable condition.    Complications:  None    Attestation:  I was present for the entire procedure    DISCLAIMER: This note was prepared with [employer] voice recognition transcription software. Garbled syntax, mangled pronouns, and other bizarre constructions may be attributed to that software system. While efforts were made to correct any mistakes made by this voice recognition program, some errors and/or omissions may remain in the note that were missed when the note was originally created.

## 2023-06-15 NOTE — PATIENT INSTRUCTIONS
DISCHARGE INSTRUCTIONS:    Discharge Procedure Orders   Other restrictions (specify):   Order Comments: No heavy lifting >10 lbs x 2 weeks  No straining or exercising x 2 weeks  No nose blowing, sneeze with mouth open     Notify your health care provider if you experience any of the following:  temperature >100.4     Notify your health care provider if you experience any of the following:  redness, tenderness, or signs of infection (pain, swelling, redness, odor or green/yellow discharge around incision site)     No dressing needed   Order Comments: No wetting incision for two days. Apply vaseline or aquafor to incisions twice a day. If crusting forms around incision you can clean with 1/2 peroxide and 1/2 water prior to applying vaseline or aquafor.

## 2023-06-15 NOTE — ANESTHESIA PROCEDURE NOTES
Intubation    Date/Time: 6/15/2023 1:26 PM  Performed by: Anna Miranda CRNA  Authorized by: Matt Cooley MD     Intubation:     Induction:  Intravenous    Intubated:  Postinduction    Mask Ventilation:  Not attempted    Attempts:  1    Attempted By:  CRNA    Method of Intubation:  Video laryngoscopy    Blade:  Henry 3    Laryngeal View Grade: Grade I - full view of cords      Difficult Airway Encountered?: No      Complications:  None    Airway Device:  Oral endotracheal tube    Airway Device Size:  7.0    Style/Cuff Inflation:  Cuffed (inflated to minimal occlusive pressure)    Tube secured:  21    Secured at:  The lips    Placement Verified By:  Capnometry    Complicating Factors:  None    Findings Post-Intubation:  BS equal bilateral

## 2023-06-15 NOTE — PLAN OF CARE
Discharge instructions given, patient and family member, verbalized understanding. Consents verified. Vitals back to baseline. Patient draining from incision into left eye moderately and swelling mildly, Dr Bullock notified, will come to bedside to evaluate. Ice pack applied to eye.

## 2023-06-15 NOTE — BRIEF OP NOTE
[facility] Eduardo Thompson - Surgery ([facility])  Brief Operative Note    Surgery Date: 6/15/2023     Surgeon(s) and Role:     * Louis Bullock MD - Primary     * Chichi Gar MD - Fellow    Assisting Surgeon: None    Pre-op Diagnosis:  Mohs defect of sidewall of nose [M95.0, Z98.890]    Post-op Diagnosis:  Post-Op Diagnosis Codes:     * Mohs defect of sidewall of nose [M95.0, Z98.890]    Procedure(s) (LRB):  RECONSTRUCTION, NOSE (Left)    Anesthesia: General    Operative Findings:   Nasal dorsal and L sidewall defect repaired with dorsal nasal flap     Estimated Blood Loss: 15 cc         Specimens:   Specimen (24h ago, onward)       Start     Ordered    06/15/23 1432  Specimen to Pathology, Surgery ENT  Once        Comments: Pre-op Diagnosis: Mohs defect of sidewall of nose [M95.0, Z98.890]Procedure(s):RECONSTRUCTION, NOSE Number of specimens: 2Name of specimens: 1. Re-excision inferior scar, 2. Deep margin (Ink marks true margin)     References:    Click here for ordering Quick Tip   Question Answer Comment   Procedure Type: ENT    Specimen Class: Known or suspected malignancy    Which provider would you like to cc? LOUIS BULLOCK    Release to patient Immediate        06/15/23 1432                      Discharge Note    OUTCOME: Patient tolerated treatment/procedure well without complication and is now ready for discharge.    DISPOSITION: Home or Self Care    FINAL DIAGNOSIS:  Mohs defect of sidewall of nose    FOLLOWUP: In clinic    DISCHARGE INSTRUCTIONS:    Discharge Procedure Orders   Other restrictions (specify):   Order Comments: No heavy lifting >10 lbs x 2 weeks  No straining or exercising x 2 weeks  No nose blowing, sneeze with mouth open     Notify your health care provider if you experience any of the following:  temperature >100.4     Notify your health care provider if you experience any of the following:  redness, tenderness, or signs of infection (pain, swelling, redness, odor or green/yellow discharge around  incision site)     No dressing needed   Order Comments: No wetting incision for two days. Apply vaseline or aquafor to incisions twice a day. If crusting forms around incision you can clean with 1/2 peroxide and 1/2 water prior to applying vaseline or aquafor.

## 2023-06-15 NOTE — INTERVAL H&P NOTE
The patient has been examined and the H&P has been reviewed:    Patient presents following final Mohs excision (photos in media). Presents for Mohs defect reconstruction.     Surgery risks, benefits and alternative options discussed and understood by patient/family.          There are no hospital problems to display for this patient.

## 2023-06-15 NOTE — ANESTHESIA PREPROCEDURE EVALUATION
06/15/2023  Radha Levin is a 63 y.o., female.  Patient Active Problem List   Diagnosis    Hip pain    Neuropathy    Tobacco abuse    Alcohol abuse           Pre-op Assessment          Review of Systems      Physical Exam    Airway:  No airway management difficulties anticipated  Dental:No active dental issues noted  Chest/Lungs:  Clear to auscultation    Heart:  Rate: Normal  Rhythm: Regular Rhythm  Sounds: Normal        Anesthesia Plan  Type of Anesthesia, risks & benefits discussed:    Anesthesia Type: Gen ETT  Informed Consent: Informed consent signed with the Patient and all parties understand the risks and agree with anesthesia plan.  All questions answered.   ASA Score: 3  Anesthesia Plan Notes: Chart reviewed. Patient seen and examined. Anesthesia plan discussed and questions answered. E-consent signed. Matt Cooley MD    Ready For Surgery From Anesthesia Perspective.     .

## 2023-06-15 NOTE — TRANSFER OF CARE
"Anesthesia Transfer of Care Note    Patient: Radha Levin    Procedure(s) Performed: Procedure(s) (LRB):  RECONSTRUCTION, NOSE (Left)    Patient location: PACU    Anesthesia Type: general    Transport from OR: Transported from OR on 6-10 L/min O2 by face mask with adequate spontaneous ventilation    Post pain: adequate analgesia    Post assessment: no apparent anesthetic complications    Post vital signs: stable    Level of consciousness: awake and alert    Nausea/Vomiting: no nausea/vomiting    Complications: none    Transfer of care protocol was followed      Last vitals:   Visit Vitals  BP (!) 157/89 (BP Location: Right arm, Patient Position: Lying)   Pulse 80   Temp 37 °C (98.6 °F)   Resp 18   Ht 5' 3" (1.6 m)   Wt 68 kg (150 lb)   LMP  (LMP Unknown)   SpO2 96%   Breastfeeding No   BMI 26.57 kg/m²     "

## 2023-06-16 NOTE — ANESTHESIA POSTPROCEDURE EVALUATION
Anesthesia Post Evaluation    Patient: Radha Levin    Procedure(s) Performed: Procedure(s) (LRB):  RECONSTRUCTION, NOSE (Left)    Final Anesthesia Type: general      Patient location during evaluation: PACU  Patient participation: Yes- Able to Participate  Level of consciousness: awake and alert  Post-procedure vital signs: reviewed and stable  Pain management: adequate  Airway patency: patent    PONV status at discharge: No PONV  Anesthetic complications: no      Cardiovascular status: blood pressure returned to baseline  Respiratory status: unassisted, spontaneous ventilation and room air  Hydration status: euvolemic            Vitals Value Taken Time   /75 06/15/23 1618   Temp 36.7 °C (98 °F) 06/15/23 1630   Pulse 82 06/15/23 1630   Resp 29 06/15/23 1630   SpO2 92 % 06/15/23 1630         No case tracking events are documented in the log.      Pain/Feliberto Score: Pain Rating Prior to Med Admin: 6 (6/15/2023  3:34 PM)  Feliberto Score: 10 (6/15/2023  3:45 PM)

## 2023-06-19 RX ORDER — HYDROCODONE BITARTRATE AND ACETAMINOPHEN 5; 325 MG/1; MG/1
1 TABLET ORAL EVERY 6 HOURS PRN
Qty: 6 TABLET | Refills: 0 | Status: SHIPPED | OUTPATIENT
Start: 2023-06-19 | End: 2023-06-21

## 2023-06-19 NOTE — PROGRESS NOTES
Discussed with patient patient's pain level above 8.  She has been cutting down on the Norco.  Prescribed 6 more pills with recommendation to schedule Tylenol and ibuprofen.  I will see her in follow-up on Wednesday.

## 2023-06-20 LAB
FINAL PATHOLOGIC DIAGNOSIS: NORMAL
GROSS: NORMAL
Lab: NORMAL
MICROSCOPIC EXAM: NORMAL

## 2023-06-21 ENCOUNTER — OFFICE VISIT (OUTPATIENT)
Dept: OTOLARYNGOLOGY | Facility: CLINIC | Age: 63
End: 2023-06-21
Payer: MEDICAID

## 2023-06-21 VITALS
SYSTOLIC BLOOD PRESSURE: 108 MMHG | BODY MASS INDEX: 25.27 KG/M2 | WEIGHT: 142.63 LBS | DIASTOLIC BLOOD PRESSURE: 73 MMHG | HEART RATE: 75 BPM

## 2023-06-21 DIAGNOSIS — Z09 POSTOP CHECK: Primary | ICD-10-CM

## 2023-06-21 PROCEDURE — 99999 PR PBB SHADOW E&M-EST. PATIENT-LVL III: CPT | Mod: PBBFAC,,, | Performed by: OTOLARYNGOLOGY

## 2023-06-21 PROCEDURE — 99024 PR POST-OP FOLLOW-UP VISIT: ICD-10-PCS | Mod: ,,, | Performed by: OTOLARYNGOLOGY

## 2023-06-21 PROCEDURE — 3078F DIAST BP <80 MM HG: CPT | Mod: CPTII,,, | Performed by: OTOLARYNGOLOGY

## 2023-06-21 PROCEDURE — 1159F MED LIST DOCD IN RCRD: CPT | Mod: CPTII,,, | Performed by: OTOLARYNGOLOGY

## 2023-06-21 PROCEDURE — 3074F SYST BP LT 130 MM HG: CPT | Mod: CPTII,,, | Performed by: OTOLARYNGOLOGY

## 2023-06-21 PROCEDURE — 1160F RVW MEDS BY RX/DR IN RCRD: CPT | Mod: CPTII,,, | Performed by: OTOLARYNGOLOGY

## 2023-06-21 PROCEDURE — 99999 PR PBB SHADOW E&M-EST. PATIENT-LVL III: ICD-10-PCS | Mod: PBBFAC,,, | Performed by: OTOLARYNGOLOGY

## 2023-06-21 PROCEDURE — 3074F PR MOST RECENT SYSTOLIC BLOOD PRESSURE < 130 MM HG: ICD-10-PCS | Mod: CPTII,,, | Performed by: OTOLARYNGOLOGY

## 2023-06-21 PROCEDURE — 1160F PR REVIEW ALL MEDS BY PRESCRIBER/CLIN PHARMACIST DOCUMENTED: ICD-10-PCS | Mod: CPTII,,, | Performed by: OTOLARYNGOLOGY

## 2023-06-21 PROCEDURE — 3078F PR MOST RECENT DIASTOLIC BLOOD PRESSURE < 80 MM HG: ICD-10-PCS | Mod: CPTII,,, | Performed by: OTOLARYNGOLOGY

## 2023-06-21 PROCEDURE — 3008F PR BODY MASS INDEX (BMI) DOCUMENTED: ICD-10-PCS | Mod: CPTII,,, | Performed by: OTOLARYNGOLOGY

## 2023-06-21 PROCEDURE — 3008F BODY MASS INDEX DOCD: CPT | Mod: CPTII,,, | Performed by: OTOLARYNGOLOGY

## 2023-06-21 PROCEDURE — 99024 POSTOP FOLLOW-UP VISIT: CPT | Mod: ,,, | Performed by: OTOLARYNGOLOGY

## 2023-06-21 PROCEDURE — 1159F PR MEDICATION LIST DOCUMENTED IN MEDICAL RECORD: ICD-10-PCS | Mod: CPTII,,, | Performed by: OTOLARYNGOLOGY

## 2023-06-21 PROCEDURE — 99213 OFFICE O/P EST LOW 20 MIN: CPT | Mod: PBBFAC | Performed by: OTOLARYNGOLOGY

## 2023-06-21 RX ORDER — HYDROCODONE BITARTRATE AND ACETAMINOPHEN 5; 325 MG/1; MG/1
1 TABLET ORAL
Qty: 5 TABLET | Refills: 0 | Status: SHIPPED | OUTPATIENT
Start: 2023-06-21

## 2023-06-21 NOTE — PROGRESS NOTES
Subjective: 63 y.o. female seen in follow up s/p reconstruction of left sidewall Mohs defect with dorsal nasal rotational flap as well as re-resection of deep margin on 06/15/2023.  Here for suture removal.  She had issues with pain postop with pain continuing at a level of 6 7/10.  I would called her in an additional 5 of Norco does.  She reports no issues with the incision she did show me pictures of her swelling that has improved.  She denies any erythema or drainage from wounds.  She is asking for pain medication.  She has tried scheduled Tylenol and ibuprofen per my recommendation.      Objective:  /73 (Patient Position: Sitting)   Pulse 75   Wt 64.7 kg (142 lb 10.2 oz)   LMP  (LMP Unknown)   BMI 25.27 kg/m²     Exam:  General No acute distress  Dorsal nasal flap viable with minimal crusting at the canthus.  Forehead Prolenes as well as nasal Prolenes removed with incisions clean dry intact.    Pathology review:   Re-excision 06/15/2023   1. Skin, inferior scar, re-excision:   - Dermal scar   - Solar elastosis   - Negative for malignancy     2. Soft tissue, deep margin, excision:   - Inflammatory debris involving fibroadipose tissue and skeletal muscle, consistent with recent excision   - No residual malignancy identified       Assessment / Plan:  I sent her an additional 5 pills of Norco but was clear with her that I will not prescribe any more narcotics after this.  Wound care discussed she had been placing Neosporin I recommended Vaseline or Aquaphor instead.  I discussed with her pathology.  She is healing very well and would be ready for radiation treatment in about 2-3 weeks.  Photo documentation obtained in media.  I will defer to Dr. Ibarra with whom she has follow-up coming up

## 2023-07-25 NOTE — ADDENDUM NOTE
Addendum  created 07/25/23 1716 by MIHAELA Carrillo MD    Attestation recorded in Intraprocedure, Intraprocedure Attestations filed, Intraprocedure Event edited

## 2023-07-26 ENCOUNTER — TELEPHONE (OUTPATIENT)
Dept: DERMATOLOGY | Facility: CLINIC | Age: 63
End: 2023-07-26
Payer: MEDICAID

## 2023-07-26 NOTE — TELEPHONE ENCOUNTER
Spoke w/pt, explained that Dr. Ibarra will just look at the repair that was done, no procedure or radiation treatment will be done at 2:30 appt on 7/27. She will discuss further treatment options with pt tomorrow. Pt verb understanding.

## 2023-07-26 NOTE — TELEPHONE ENCOUNTER
----- Message from Aniyah Hernandez sent at 7/26/2023 12:20 PM CDT -----  Regarding: Pt advice  Contact: [phone]  Pt is calling to speak with the provider nurse. No more information given it does pertain to appt 7/27 and would like to discuss plan of care. Please call

## 2023-07-28 ENCOUNTER — TELEPHONE (OUTPATIENT)
Dept: DERMATOLOGY | Facility: CLINIC | Age: 63
End: 2023-07-28
Payer: MEDICAID

## 2023-07-28 NOTE — TELEPHONE ENCOUNTER
Called pt to reschedule w/c appt for nasal dorsum. Pt initially stated that she did not want to reschedule her appt. Pt also initially stated that she was confused as to why Dr. Ibarra mentioned radiation. Stated that I can reschedule her appt with Dr. Ibarra so that way she can discuss and answer any questions/concerns. Pt stated that it is inconvenient to drive to the office here, but asked if Dr. Ibarra could discuss with her general dermatologist who referred her here instead, so that way she would only have to drive to her general derm's office. I once again mentioned rescheduling appt with Dr. Ibarra so that way she can answer questions face to face, as pt continued to ask questions about radiation. Pt then stated that she spoke to someone in the office over the phone who told her she was not getting radiation. Informed pt that it was not me who spoke to her, so I could not verify what was told to her over the phone at that point and that I could not confirm to the patient if she was getting radiation or not, as any plans are to be determined by the dr. Upon looking at pt's chart, noticed that her procedure note stated that Dr. Ibarra would like to see her back 6 weeks post procedure to check on wound and to discuss further treatment. Pt then stated that she would discuss rescheduling the appt with her daughter later today, told patient that she can call the office back to reschedule her appointment, pt verbalized understanding. Pt sending in photo of site for Dr. Ibarra to review.

## 2023-08-08 DIAGNOSIS — C44.311 BASAL CELL CARCINOMA OF LEFT SIDE OF NOSE: Primary | ICD-10-CM

## 2023-08-09 ENCOUNTER — TELEPHONE (OUTPATIENT)
Dept: DERMATOLOGY | Facility: CLINIC | Age: 63
End: 2023-08-09
Payer: MEDICAID

## 2023-08-09 NOTE — TELEPHONE ENCOUNTER
Called pt to inform her that Dr. Ibarra put in an order for radiation post Mohs on her nasal dorsum. Informed pt, per Dr. 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 Informed pt that someone from rad/onc will be giving her a call to set up radiation. Pt verbalized understanding. Also let pt know that I did not receive a photo of the site, as she stated during the last phone call that she would send a photo of the site. Pt stated that she would ask her friend to check and possibly resend the photo.

## 2024-04-22 ENCOUNTER — TELEPHONE (OUTPATIENT)
Dept: DERMATOLOGY | Facility: CLINIC | Age: 64
End: 2024-04-22
Payer: MEDICAID

## 2024-04-22 NOTE — TELEPHONE ENCOUNTER
Informed pt to contact Dr. Bullock's office if she needs clearance for cataract surgery since he was the last physician to discharge her. Also gave her number for general derm to establish care. Pt appreciated the call back.

## 2024-04-22 NOTE — TELEPHONE ENCOUNTER
----- Message from Diana Welsh sent at 4/22/2024  1:43 PM CDT -----  Regarding: medical clearance  Contact: [phone]  Pt is calling to speak with someone in provider office in regards to scheduling an appt with the provider for medical clearance to have cataract removal surgery pt is scheduled for pre-op 5/10 she has to have the medical clearance form filled out by 5/10/24 pt  is asking for a return call please call pt at   [phone] pt stated she has to have a five business day notice for appt date due to her transportation her daughter has to inform her employer

## 2024-09-12 DIAGNOSIS — Z12.31 ENCOUNTER FOR SCREENING MAMMOGRAM FOR MALIGNANT NEOPLASM OF BREAST: Primary | ICD-10-CM

## 2025-06-10 ENCOUNTER — TELEPHONE (OUTPATIENT)
Dept: ENDOSCOPY | Facility: HOSPITAL | Age: 65
End: 2025-06-10
Payer: MEDICAID

## 2025-06-10 NOTE — TELEPHONE ENCOUNTER
Patient contacted to schedule procedure   Patient is requesting the [place] location   Please call patient to schedule procedure referral scan into media

## (undated) DEVICE — GOWN FAB REINF SET-IN SLV 2XL

## (undated) DEVICE — Device

## (undated) DEVICE — DRAPE EENT SPLIT STERILE

## (undated) DEVICE — SYR IRRIGATION BULB STER 60ML

## (undated) DEVICE — CORD BIPOLAR 12 FOOT

## (undated) DEVICE — SPONGE LAP 18X18 PREWASHED

## (undated) DEVICE — TOWEL OR DISP STRL BLUE 4/PK